# Patient Record
Sex: MALE | Race: WHITE | NOT HISPANIC OR LATINO | Employment: UNEMPLOYED | URBAN - NONMETROPOLITAN AREA
[De-identification: names, ages, dates, MRNs, and addresses within clinical notes are randomized per-mention and may not be internally consistent; named-entity substitution may affect disease eponyms.]

---

## 2018-02-22 DIAGNOSIS — M25.562 LEFT KNEE PAIN, UNSPECIFIED CHRONICITY: Primary | ICD-10-CM

## 2018-02-26 ENCOUNTER — OFFICE VISIT (OUTPATIENT)
Dept: ORTHOPEDIC SURGERY | Facility: CLINIC | Age: 38
End: 2018-02-26

## 2018-02-26 ENCOUNTER — HOSPITAL ENCOUNTER (OUTPATIENT)
Dept: GENERAL RADIOLOGY | Facility: HOSPITAL | Age: 38
Discharge: HOME | End: 2018-02-26
Attending: ORTHOPAEDIC SURGERY | Admitting: ORTHOPAEDIC SURGERY

## 2018-02-26 VITALS — WEIGHT: 315 LBS | HEIGHT: 73 IN | BODY MASS INDEX: 41.75 KG/M2

## 2018-02-26 DIAGNOSIS — M25.462 EFFUSION OF KNEE JOINT, LEFT: ICD-10-CM

## 2018-02-26 DIAGNOSIS — M17.12 PRIMARY OSTEOARTHRITIS OF ONE KNEE, LEFT: Primary | ICD-10-CM

## 2018-02-26 DIAGNOSIS — M25.562 LEFT KNEE PAIN, UNSPECIFIED CHRONICITY: ICD-10-CM

## 2018-02-26 PROBLEM — K21.9 GERD (GASTROESOPHAGEAL REFLUX DISEASE): Status: ACTIVE | Noted: 2018-02-26

## 2018-02-26 PROBLEM — M19.90 OSTEOARTHRITIS: Status: ACTIVE | Noted: 2018-02-26

## 2018-02-26 PROBLEM — M10.9 GOUT: Status: ACTIVE | Noted: 2018-02-26

## 2018-02-26 PROBLEM — I10 HYPERTENSION: Status: ACTIVE | Noted: 2018-02-26

## 2018-02-26 PROBLEM — E78.00 HIGH CHOLESTEROL: Status: ACTIVE | Noted: 2018-02-26

## 2018-02-26 PROCEDURE — 73560 X-RAY EXAM OF KNEE 1 OR 2: CPT

## 2018-02-26 PROCEDURE — 20610 DRAIN/INJ JOINT/BURSA W/O US: CPT | Performed by: ORTHOPAEDIC SURGERY

## 2018-02-26 PROCEDURE — 73560 X-RAY EXAM OF KNEE 1 OR 2: CPT | Performed by: RADIOLOGY

## 2018-02-26 RX ORDER — METOLAZONE 2.5 MG/1
TABLET ORAL
COMMUNITY
Start: 2018-02-13

## 2018-02-26 RX ORDER — PANTOPRAZOLE SODIUM 40 MG/1
TABLET, DELAYED RELEASE ORAL
COMMUNITY
Start: 2018-02-13

## 2018-02-26 RX ORDER — METHYLPREDNISOLONE ACETATE 40 MG/ML
40 INJECTION, SUSPENSION INTRA-ARTICULAR; INTRALESIONAL; INTRAMUSCULAR; SOFT TISSUE
Status: COMPLETED | OUTPATIENT
Start: 2018-02-26 | End: 2018-02-26

## 2018-02-26 RX ORDER — SIMVASTATIN 10 MG
TABLET ORAL
COMMUNITY
Start: 2018-02-21

## 2018-02-26 RX ORDER — LIDOCAINE HYDROCHLORIDE 20 MG/ML
2 INJECTION, SOLUTION INFILTRATION; PERINEURAL
Status: COMPLETED | OUTPATIENT
Start: 2018-02-26 | End: 2018-02-26

## 2018-02-26 RX ORDER — SPIRONOLACTONE 25 MG/1
TABLET ORAL
COMMUNITY
Start: 2018-02-13

## 2018-02-26 RX ORDER — SUCRALFATE 1 G/1
TABLET ORAL
COMMUNITY
Start: 2018-02-13

## 2018-02-26 RX ORDER — IBUPROFEN 800 MG/1
TABLET ORAL
COMMUNITY
Start: 2018-02-13

## 2018-02-26 RX ADMIN — LIDOCAINE HYDROCHLORIDE 2 ML: 20 INJECTION, SOLUTION INFILTRATION; PERINEURAL at 13:09

## 2018-02-26 RX ADMIN — METHYLPREDNISOLONE ACETATE 40 MG: 40 INJECTION, SUSPENSION INTRA-ARTICULAR; INTRALESIONAL; INTRAMUSCULAR; SOFT TISSUE at 13:09

## 2018-02-26 NOTE — PROGRESS NOTES
Follow-up Visit         Patient: Redd Sr  YOB: [birth year]      HPI:  Redd Sr, 37 y.o. male seen today in follow up left knee pain and known arthritis especially involving the patellofemoral joint from previous patellar fracture.  He underwent open reduction internal fixation 2004.  He received aspiration steroid injection approximately 1 year ago and is just now begun to have recurrence of his symptoms.  No new injury.  He denies catching giving way or locking.     Medical History:  Patient Active Problem List   Diagnosis   • Right knee pain   • Left knee pain   • High cholesterol   • Hypertension   • GERD (gastroesophageal reflux disease)   • Osteoarthritis   • Gout     Past Medical History:   Diagnosis Date   • Acid reflux    • Gout    • High cholesterol    • Osteoarthritis    • Sleep apnea        Surgical History:  Past Surgical History:   Procedure Laterality Date   • CHOLECYSTECTOMY     • EXTERNAL EAR SURGERY     • HERNIA REPAIR     • KNEE ARTHROSCOPY     • TONSILLECTOMY         Examination:  left Knee: Mild effusion.  He has mild medial joint line tenderness, moderate crepitance patella with discomfort on flexion.  No instability with varus valgus stressing Lachman or drawer, neurovascular grossly intact.    Assessment:   37 y.o. male with known osteoarthritis left knee primarily patellofemoral joint but recent radiographs show involvement of the medial compartment with squaring of the medial femoral condyle.  Today under sterile technique with lidocaine block, he underwent aspiration of his left knee removing 15cc of serous fluid then injecting Depo-Medrol 40 mg intra-articular.     Diagnosis Plan   1. Effusion of knee joint, left  Large Joint Arthrocentesis   2. Primary osteoarthritis of one knee, left  Large Joint Arthrocentesis       Plan:  He will follow up depending on his response to the steroid injection.   Large Joint Arthrocentesis  Date/Time: 2/26/2018 1:09  PM  Consent given by: patient  Site marked: site marked  Supporting Documentation  Indications: pain and joint swelling   Procedure Details  Location: knee - L knee  Preparation: Patient was prepped and draped in the usual sterile fashion  Needle size: 18 G  Approach: anterolateral  Medications administered: 2 mL lidocaine 2%; 40 mg methylPREDNISolone acetate 40 MG/ML  Aspirate amount: 10 mL  Aspirate: blood-tinged  Patient tolerance: patient tolerated the procedure well with no immediate complications            Cc:  Mal Salgado MD    Scribed for Quincy Langston MD by Vy Langston RN.1:07 PM 02/26/2018